# Patient Record
(demographics unavailable — no encounter records)

---

## 2024-10-09 NOTE — ASSESSMENT
[FreeTextEntry1] : 49 yo F PMH HTN and HLD presenting for evaluation of sleep apnea. She is symptomatic with loud snoring, frequent nocturnal awakenings, and occasional awakenings secondary to respiratory events. Her symptoms are suggestive of the possibility of having sleep apnea. She has been counseled on the health risks associated with KEVIN including HTN, cardiovascular disease, arrhythmia, and stroke. Potential therapeutic options have been discussed. She will schedule an HST and will follow up for the results. She was also counseled on the importance of weight loss and the relationship between weight and KEVIN.  Follow up after HST

## 2024-10-09 NOTE — HISTORY OF PRESENT ILLNESS
[Snoring] : snoring [Frequent Nocturnal Awakening] : frequent nocturnal awakening [Awakes Unrefreshed] : awakening unrefreshed [Recent  Weight Gain] : recent weight gain [DMS] : DMS [FreeTextEntry1] : 49 yo F PMH HTN and HLD presenting for evaluation of sleep apnea.   Reports history of chronic snoring that has worsened over the past few years. Symptoms have worsened in the past year with associated 25lb weight gain. Also with associated awakenings secondary to gasping. Previously worked in the [email or web address] night shift from 4am-1230pm and would frequently average 3-4 hours of sleep a night. Transitioned to day shift 2018. Does report symptoms of daytime fatigue without excessive somnolence though does nap 2-3 times a week. Does report perimenopausal symptoms.   Goes to bed: 10-11pm Falls asleep within: 30mins Gets out of bed: 530-630am Nocturnal awakenings: at least 2-3 times a night, gasping occurs once a week Naps: 2-3 times a week [Witnessed Apneas] : no witnessed sleep apnea [Awakes with Headache] : no headache upon awakening [Awakening With Dry Mouth] : no dry mouth upon awakening [Daytime Somnolence] : no daytime somnolence [DIS] : no DIS [ESS] : 4

## 2024-11-20 NOTE — ASSESSMENT
[FreeTextEntry1] : 51 yo F PMH HTN and HLD presenting for follow up of sleep apnea. She was initially seen by me for symptoms of chronic snoring and occasional nocturnal awakenings secondary to gasping that was associated with approximately 25 pound weight gain over the past few years.  She was referred for diagnostic testing with home sleep test from 11/4/2024 demonstrating overall mild sleep apnea with AHI of 9.3/h with T90 of 4.2% and seth saturation of 82%.  Risks of untreated sleep apnea and potential therapeutic options were discussed.  Given her ongoing symptoms she was referred for APAP titration for further management of KEVIN.  Importance of weight management and its relation to sleep apnea was discussed.  Follow-up after APAP titration.

## 2024-11-20 NOTE — HISTORY OF PRESENT ILLNESS
[Snoring] : snoring [FreeTextEntry1] : 51 yo F PMH HTN and HLD presenting for follow up of sleep apnea.  She was initially seen by me 10/9/2024 for symptoms of chronic snoring that worsened with approximate 25 pound weight gain over the past few years.  Also noted to have symptoms of occasional nocturnal awakenings secondary to gasping.  [place] transitioned to dayshift in 2018.  Also noted to have perimenopausal symptoms during initial evaluation.  She was referred for diagnostic home sleep apnea testing with study from 11/4/2024 demonstrating overall mild sleep apnea with AHI of 9.3/h with T90 4.2% and seth saturation of 82%.   She reports ongoing symptoms of daytime fatigue, chronic snoring, and overall poor sleep quality.  States that she has gained some weight over the past few months due to travel and life stressors. Otherwise no significant changes since prior evaluation.

## 2024-11-20 NOTE — ASSESSMENT
[FreeTextEntry1] : 49 yo F PMH HTN and HLD presenting for follow up of sleep apnea. She was initially seen by me for symptoms of chronic snoring and occasional nocturnal awakenings secondary to gasping that was associated with approximately 25 pound weight gain over the past few years.  She was referred for diagnostic testing with home sleep test from 11/4/2024 demonstrating overall mild sleep apnea with AHI of 9.3/h with T90 of 4.2% and seth saturation of 82%.  Risks of untreated sleep apnea and potential therapeutic options were discussed.  Given her ongoing symptoms she was referred for APAP titration for further management of KEVIN.  Importance of weight management and its relation to sleep apnea was discussed.  Follow-up after APAP titration.

## 2024-11-20 NOTE — HISTORY OF PRESENT ILLNESS
[Snoring] : snoring [FreeTextEntry1] : 49 yo F PMH HTN and HLD presenting for follow up of sleep apnea.  She was initially seen by me 10/9/2024 for symptoms of chronic snoring that worsened with approximate 25 pound weight gain over the past few years.  Also noted to have symptoms of occasional nocturnal awakenings secondary to gasping.  [place] transitioned to dayshift in 2018.  Also noted to have perimenopausal symptoms during initial evaluation.  She was referred for diagnostic home sleep apnea testing with study from 11/4/2024 demonstrating overall mild sleep apnea with AHI of 9.3/h with T90 4.2% and seth saturation of 82%.   She reports ongoing symptoms of daytime fatigue, chronic snoring, and overall poor sleep quality.  States that she has gained some weight over the past few months due to travel and life stressors. Otherwise no significant changes since prior evaluation.

## 2025-02-19 NOTE — HISTORY OF PRESENT ILLNESS
[FreeTextEntry1] : 49 yo F PMH HTN and HLD presenting for follow up of sleep apnea.  She was initially seen by me 10/9/2024 for symptoms of chronic snoring that worsened with approximate 25 pound weight gain over the past few years. Also noted to have symptoms of occasional nocturnal awakenings secondary to gasping. She [place] transitioned to dayshift in 2018. Also noted to have perimenopausal symptoms during initial evaluation. She was referred for diagnostic home sleep apnea testing with study from 11/4/2024 demonstrating overall mild sleep apnea with AHI of 9.3/h with T90 4.2% and seth saturation of 82%.   She was referred for follow up APAP titration though experienced difficulty with usage with average usage of 5 minutes. Tried both nasal and FFM though unable to tolerate either interface. Has not had recent episodes of gasping/choking though continues to experience daytime fatigue and nocturnal awakenings. Has chronic nasal congestion which untreated. Also reports ongoing external stressors that are contributing to daytime symptoms.  [Frequent Nocturnal Awakening] : frequent nocturnal awakening

## 2025-02-19 NOTE — ASSESSMENT
[FreeTextEntry1] : 51 yo F PMH HTN and HLD presenting for follow up of sleep apnea. Initially seen for symptoms of chronic snoring and frequent nocturnal awakenings with worsening of symptoms after associated 25 pound weight gain.  Diagnostic study from 11/4/2024 demonstrated overall mild KEVIN with AHI of 9.3/h and T90 4.2%.  She was referred for APAP titration though unable to tolerate with average usage of 5 minutes.  Alternative therapies for mild KEVIN including oral advancement therapy, excite KEVIN, and weight loss/zepbound were discussed.  She also reports comorbid chronic nasal congestion which may be worsening nocturnal sleep disordered breathing.  She will undergo trial of Flonase and assess overall response.  She was also provided a list of dentists for OAT evaluation.  She is instructed to reach out to me regarding her decisions for therapy moving forward after Flonase trial.  Follow up in 2-3 weeks

## 2025-02-19 NOTE — REASON FOR VISIT
[Follow-Up] : a follow-up visit [Home] : at home, [unfilled] , at the time of the visit. [Medical Office: ([facility])___] : at the medical office located in  [Telehealth (audio & video)] : This visit was provided via telehealth using real-time 2-way audio visual technology. [Verbal consent obtained from patient] : the patient, [unfilled] [FreeTextEntry2] : sleep apnea

## 2025-04-01 NOTE — HEALTH RISK ASSESSMENT
[Yes] : Yes [Monthly or less (1 pt)] : Monthly or less (1 point) [1 or 2 (0 pts)] : 1 or 2 (0 points) [Never (0 pts)] : Never (0 points) [No] : In the past 12 months have you used drugs other than those required for medical reasons? No [0] : 2) Feeling down, depressed, or hopeless: Not at all (0) [PHQ-2 Negative - No further assessment needed] : PHQ-2 Negative - No further assessment needed [NO] : No [With Family] : lives with family [Employed] : employed [] :  [# Of Children ___] : has [unfilled] children [Feels Safe at Home] : Feels safe at home [Fully functional (bathing, dressing, toileting, transferring, walking, feeding)] : Fully functional (bathing, dressing, toileting, transferring, walking, feeding) [Fully functional (using the telephone, shopping, preparing meals, housekeeping, doing laundry, using] : Fully functional and needs no help or supervision to perform IADLs (using the telephone, shopping, preparing meals, housekeeping, doing laundry, using transportation, managing medications and managing finances) [Smoke Detector] : smoke detector [Carbon Monoxide Detector] : carbon monoxide detector [Former] : Former [> 15 Years] : > 15 Years [HIV test declined] : HIV test declined [Hepatitis C test declined] : Hepatitis C test declined [Audit-CScore] : 1 [BBC0Amglz] : 0 [de-identified] : 1 pack a day x 15 years  [Change in mental status noted] : No change in mental status noted [Language] : denies difficulty with language [Handling Complex Tasks] : denies difficulty handling complex tasks [Reports changes in hearing] : Reports no changes in hearing [Reports changes in vision] : Reports no changes in vision [Reports changes in dental health] : Reports no changes in dental health [MammogramDate] : due [PapSmearDate] : 11/23 [PapSmearComments] : Dr Hodgson [ColonoscopyDate] : due [de-identified] : with  and 3 children [FreeTextEntry2] : [employer].   [de-identified] : 21 yo daughter, 19 yo son, 15 yo daughter

## 2025-04-01 NOTE — PHYSICAL EXAM
[No Acute Distress] : no acute distress [Well-Appearing] : well-appearing [Normal Sclera/Conjunctiva] : normal sclera/conjunctiva [PERRL] : pupils equal round and reactive to light [EOMI] : extraocular movements intact [Normal Outer Ear/Nose] : the outer ears and nose were normal in appearance [Normal Oropharynx] : the oropharynx was normal [Normal TMs] : both tympanic membranes were normal [Supple] : supple [No Respiratory Distress] : no respiratory distress  [No Accessory Muscle Use] : no accessory muscle use [Clear to Auscultation] : lungs were clear to auscultation bilaterally [Normal Rate] : normal rate  [Regular Rhythm] : with a regular rhythm [Normal S1, S2] : normal S1 and S2 [No Murmur] : no murmur heard [No Edema] : there was no peripheral edema [Soft] : abdomen soft [Non Tender] : non-tender [Non-distended] : non-distended [Normal Bowel Sounds] : normal bowel sounds [Grossly Normal Strength/Tone] : grossly normal strength/tone [No Focal Deficits] : no focal deficits [Speech Grossly Normal] : speech grossly normal [Alert and Oriented x3] : oriented to person, place, and time [de-identified] : 12 in x 5 in round, red, circular rash with central clearing. Blanching. Tender to touch.

## 2025-04-01 NOTE — REVIEW OF SYSTEMS
[Negative] : Psychiatric [Itching] : Itching [Skin Rash] : skin rash [Mole Changes] : no mole changes [Nail Changes] : no nail changes [Hair Changes] : no hair changes

## 2025-04-01 NOTE — HISTORY OF PRESENT ILLNESS
[FreeTextEntry1] : CPE [de-identified] : 51 yo F pmh HTN, KEVIN, HLD presents for CPE Reports itching throughout body for many years, however symptoms come and go.  Patient reports after eating carbohydrates such as pasta, she reports feel bloated and itching worsens.  Reports having itching and rash on left shin which has been present for the past 3 weeks. Has tenderness to touch. Does not feel swollen. No fevers, chills, muscle aches. No tick bites. Was [place] Reports increased stress due to 's diagnosis of stage 3 lung cancer. He is currently in hospital and condition has been worsening.

## 2025-04-01 NOTE — HEALTH RISK ASSESSMENT
[Yes] : Yes [Monthly or less (1 pt)] : Monthly or less (1 point) [1 or 2 (0 pts)] : 1 or 2 (0 points) [Never (0 pts)] : Never (0 points) [No] : In the past 12 months have you used drugs other than those required for medical reasons? No [0] : 2) Feeling down, depressed, or hopeless: Not at all (0) [PHQ-2 Negative - No further assessment needed] : PHQ-2 Negative - No further assessment needed [NO] : No [With Family] : lives with family [Employed] : employed [] :  [# Of Children ___] : has [unfilled] children [Feels Safe at Home] : Feels safe at home [Fully functional (bathing, dressing, toileting, transferring, walking, feeding)] : Fully functional (bathing, dressing, toileting, transferring, walking, feeding) [Fully functional (using the telephone, shopping, preparing meals, housekeeping, doing laundry, using] : Fully functional and needs no help or supervision to perform IADLs (using the telephone, shopping, preparing meals, housekeeping, doing laundry, using transportation, managing medications and managing finances) [Smoke Detector] : smoke detector [Carbon Monoxide Detector] : carbon monoxide detector [Former] : Former [> 15 Years] : > 15 Years [HIV test declined] : HIV test declined [Hepatitis C test declined] : Hepatitis C test declined [Audit-CScore] : 1 [[id number]] : 0 [de-identified] : 1 pack a day x 15 years  [Change in mental status noted] : No change in mental status noted [Language] : denies difficulty with language [Handling Complex Tasks] : denies difficulty handling complex tasks [Reports changes in hearing] : Reports no changes in hearing [Reports changes in vision] : Reports no changes in vision [Reports changes in dental health] : Reports no changes in dental health [MammogramDate] : due [PapSmearDate] : 11/23 [PapSmearComments] : Dr Hodgson [ColonoscopyDate] : due [de-identified] : with  and 3 children [FreeTextEntry2] : [employer].   [de-identified] : 21 yo daughter, 17 yo son, 17 yo daughter

## 2025-04-01 NOTE — PHYSICAL EXAM
[No Acute Distress] : no acute distress [Well-Appearing] : well-appearing [Normal Sclera/Conjunctiva] : normal sclera/conjunctiva [PERRL] : pupils equal round and reactive to light [EOMI] : extraocular movements intact [Normal Outer Ear/Nose] : the outer ears and nose were normal in appearance [Normal Oropharynx] : the oropharynx was normal [Normal TMs] : both tympanic membranes were normal [Supple] : supple [No Respiratory Distress] : no respiratory distress  [No Accessory Muscle Use] : no accessory muscle use [Clear to Auscultation] : lungs were clear to auscultation bilaterally [Normal Rate] : normal rate  [Regular Rhythm] : with a regular rhythm [Normal S1, S2] : normal S1 and S2 [No Murmur] : no murmur heard [No Edema] : there was no peripheral edema [Soft] : abdomen soft [Non Tender] : non-tender [Non-distended] : non-distended [Normal Bowel Sounds] : normal bowel sounds [Grossly Normal Strength/Tone] : grossly normal strength/tone [No Focal Deficits] : no focal deficits [Speech Grossly Normal] : speech grossly normal [Alert and Oriented x3] : oriented to person, place, and time [de-identified] : 12 in x 5 in round, red, circular rash with central clearing. Blanching. Tender to touch.

## 2025-04-01 NOTE — HISTORY OF PRESENT ILLNESS
[FreeTextEntry1] : CPE [de-identified] : 51 yo F pmh HTN, KEVIN, HLD presents for CPE Reports itching throughout body for many years, however symptoms come and go.  Patient reports after eating carbohydrates such as pasta, she reports feel bloated and itching worsens.  Reports having itching and rash on left shin which has been present for the past 3 weeks. Has tenderness to touch. Does not feel swollen. No fevers, chills, muscle aches. No tick bites. Was at resort in [place] [place] Reports increased stress due to 's diagnosis of stage 3 lung cancer. He is currently in hospital and condition has been worsening.

## 2025-04-01 NOTE — PLAN
[FreeTextEntry1] : Health Care Maintenance - routine labs, follow up results -- bloodwork performed in office - UTD influenza vaccine  - EKG May 2024 . will be following up with cardio - Colonoscopy due -- gastro referral given - Mammo due-- has script from GYN - Pap 12/24 Dr Golden - depression screen negative - recommend annual skin cancer screening with Dermatologist - recommended annual eye exam with Ophthalmologist - recommended annual dental exam- h/o HLD, HTN, KEVIN - continue lifestyle modifications - CPE in 1 year or sooner visit as needed  HTN - Blood pressure at goal  - continue amlodipine 5 mg QD  - advised to log BP at home.  - advised on dietary changes, increasing exercise, avoiding excess salt   Abdominal bloating - f/u celiac panel - gastro referral given  Rash, itching - will send in clotrimazole cream - f/u cbc to assess white count.  - low suspicion for lyme due to no recent tick exposures, however with round rash with central clearing, lyme panel ordered - derm follow up

## 2025-04-23 NOTE — HISTORY OF PRESENT ILLNESS
[FreeTextEntry1] : 50 year old woman with HTN on amlodipine, KEVIN HLD BMI = 35.67 she has had fatigue and itchiniess she recently had rash on Left ant shin  - topical antifungal given and it has since improved  She had fever with COVID July 2024 The most recent time she was in an area with potential tick exposure  was Aug 2024 - she denies any febrile or subjective illness. She is under the stress with her 's terminal lung cancer  - he is at in pt hospice at [facility] - her 3 children have come in from [place] to be together she notes her dog has been ill and she has been in Vet office.  On 3/31/25:  WBC = 9.98; H/H = 13.7/40.2; plt = 284k    Ehrllichia chafferensis (HME) IgG 1:1024 Anaplasma phagocytophilum (HGA ) IgG 1:640 Neg Babesi microti <1:16 Neg Lyme serology  Other labs were normal except increased cholesterol  She reports having had rash after PCN and Tetracycline as child - she does not recall the nature of the rash  She was prescribed Rifampin and took 2 doses when developed flat rash over her anterior chest  - rash was flat  she has no fever, malaise

## 2025-04-23 NOTE — PHYSICAL EXAM
[General Appearance - Alert] : alert [General Appearance - In No Acute Distress] : in no acute distress [Sclera] : the sclera and conjunctiva were normal [PERRL With Normal Accommodation] : pupils were equal in size, round, reactive to light [Extraocular Movements] : extraocular movements were intact [Outer Ear] : the ears and nose were normal in appearance [Oropharynx] : the oropharynx was normal with no thrush [Neck Appearance] : the appearance of the neck was normal [Neck Cervical Mass (___cm)] : no neck mass was observed [Jugular Venous Distention Increased] : there was no jugular-venous distention [Thyroid Diffuse Enlargement] : the thyroid was not enlarged [Auscultation Breath Sounds / Voice Sounds] : lungs were clear to auscultation bilaterally [Heart Rate And Rhythm] : heart rate was normal and rhythm regular [Heart Sounds Gallop] : no gallops [Heart Sounds] : normal S1 and S2 [Murmurs] : no murmurs [Heart Sounds Pericardial Friction Rub] : no pericardial rub [Edema] : there was no peripheral edema [Bowel Sounds] : normal bowel sounds [Abdomen Soft] : soft [Abdomen Tenderness] : non-tender [Abdomen Mass (___ Cm)] : no abdominal mass palpated [Costovertebral Angle Tenderness] : no CVA tenderness [No Palpable Adenopathy] : no palpable adenopathy [Musculoskeletal - Swelling] : no joint swelling [Nail Clubbing] : no clubbing  or cyanosis of the fingernails [Motor Tone] : muscle strength and tone were normal [Skin Color & Pigmentation] : normal skin color and pigmentation [] : no rash [No Focal Deficits] : no focal deficits [Oriented To Time, Place, And Person] : oriented to person, place, and time [Affect] : the affect was normal [FreeTextEntry1] : mild ressidual redness left ant tib region LLE

## 2025-04-23 NOTE — REVIEW OF SYSTEMS
[Feeling Tired] : feeling tired [As Noted in HPI] : as noted in HPI [Itching] : itching [Negative] : Heme/Lymph

## 2025-04-23 NOTE — ASSESSMENT
[Medical Care Issues] : medical care issues [FreeTextEntry1] : 50 year old woman with HTN on amlodipine, KEVIN HLD BMI = 35.67 low risk remote history of PCN, Tetracycline allergy prompt reaction to Rifampin with skin rash  The elevated IgG levels to Ehrlichia chaffeensis and Anplasma phagocytophilum from 3/31/25 suggests exposure with self limited infection last August 2024 These agents causes an acute febrile illness  usually 2 weeks after exposure. Her remote time since being in an area of potential exposure and the wrong seasonality for these illnesses (ticks are most liklely to feed and bite people during the summer) with the absence of symptoms or hematologic abnormalities rules out active infection.  If illness were more likely, I would consider doxycycline challenge CBC to be repeated to assure stability

## 2025-05-27 NOTE — PLAN
[FreeTextEntry1] : Joint pain - rheum follow up  frequent bowel movement, GERD - due for colonoscopy. Gastro referral given - advised to avoid spicy foods, caffeine, tomatoes, laying down quickly after eating - trial with PPI - elevate head of bed

## 2025-05-27 NOTE — REVIEW OF SYSTEMS
[Abdominal Pain] : no abdominal pain [Nausea] : no nausea [Constipation] : no constipation [Diarrhea] : diarrhea [Vomiting] : no vomiting [Heartburn] : heartburn [Melena] : no melena [Joint Pain] : joint pain [Joint Stiffness] : no joint stiffness [Joint Swelling] : no joint swelling [Muscle Weakness] : no muscle weakness [Muscle Pain] : no muscle pain [Back Pain] : no back pain [Negative] : Neurological [FreeTextEntry7] : frequent bowel movements

## 2025-05-27 NOTE — HISTORY OF PRESENT ILLNESS
[Home] : at home, [unfilled] , at the time of the visit. [Other Location: e.g. Home (Enter Location, City,State)___] : at [unfilled] [Telehealth (audio & video)] : This visit was provided via telehealth using real-time 2-way audio visual technology. [Verbal consent obtained from patient] : the patient, [unfilled] [FreeTextEntry1] : follow up [de-identified] : 51 yo F pmh HTN, KEVIN, HLD presents for follow up Seen at [facility] on May 19, 2025  Presented due to 1 day of diffuse body swelling and joint pain.   Of note, recently seen by RUY macias to possible ehrlichiosis, however told she told not have it.   Labs in ED showed ESR 35.   She was discharged home with prednisone and advised to follow up with rheum  Had appointment scheduled with rheum. Seen by rheum last week. Told symptoms may be due to stress. Patient reports improvement in joint swelling with steroids. Patient reports persistent GERD and frequent bowel movements. Denies diarrhea, blood in stool.  Has not had colonoscopy.  Patient with increased stress due to recent death of  from lung cancer. Was on hospice. States she is doing okay--has good days and bad days. Considering therapy. Currently working from home. Reports good support in friends and workplace.

## 2025-05-28 NOTE — ASSESSMENT
[FreeTextEntry1] : There is no evidence of inflammatory arthritis on exam at this time.  However, symptoms are likely masked by current use of steroids. Given presence of purpura on exam, concern for LCV.  check additional labs as outlined below  Suspect post-viral etiology.  OV 1 month, sooner PRN

## 2025-05-28 NOTE — HISTORY OF PRESENT ILLNESS
[FreeTextEntry1] : Pt went to ED Monday morning due to severe pain and "whole body swelled".  She has pain in the neck, hands and feet.  In February, she experienced pain and swelling in the right knee.  She used a compression band, and symptoms improved.  She was then taking care of her terminally ill  and did not have the time to go for an evaluation.  Pt 's passed away 3 weeks ago. The pain in the neck, hands and feet came suddenly and was severe.  Pt is also experiencing stomach swelling and acid reflux.  +history of viral illness which resolved after several days.  Pt had blood work done at the hospital and reviewed in HIE.  Work up negative thus far.  Pt started on prednisone in ED and discharged home. Symptoms of joint pain improved.   Of note, patient had a rash on her leg earlier this year and underwent extensive infectious work up which was inconclusive.

## 2025-05-28 NOTE — PHYSICAL EXAM
[General Appearance - Alert] : alert [General Appearance - In No Acute Distress] : in no acute distress [FreeTextEntry1] : faint petechial purpuric rash  [Oriented To Time, Place, And Person] : oriented to person, place, and time [Impaired Insight] : insight and judgment were intact [Affect] : the affect was normal

## 2025-06-12 NOTE — PHYSICAL EXAM
[Alert] : alert [Normal Voice/Communication] : normal voice/communication [Healthy Appearing] : healthy appearing [No Acute Distress] : no acute distress [Sclera] : the sclera and conjunctiva were normal [Normal Appearance] : the appearance of the neck was normal [No Neck Mass] : no neck mass was observed [Respiration, Rhythm And Depth] : normal respiratory rhythm and effort [Auscultation Breath Sounds / Voice Sounds] : lungs were clear to auscultation bilaterally [Heart Rate And Rhythm] : heart rate was normal and rhythm regular [Normal S1, S2] : normal S1 and S2 [Murmurs] : no murmurs [None] : no edema [Bowel Sounds] : normal bowel sounds [Abdomen Tenderness] : non-tender [No Masses] : no abdominal mass palpated [] : no hepatosplenomegaly [Abdomen Soft] : soft [No CVA Tenderness] : no CVA  tenderness [Normal Color / Pigmentation] : normal skin color and pigmentation [Oriented To Time, Place, And Person] : oriented to person, place, and time

## 2025-06-12 NOTE — HISTORY OF PRESENT ILLNESS
[FreeTextEntry1] :   -  Ms. Argelia Pickens is a 50-year-old woman with a history of hypertension, obstructive sleep apnea, and hyperlipidemia, referred by Dr. Kellie Krishnamurthy for evaluation of frequent bowel movements. The patient reports experiencing very frequent bowel movements, particularly after eating or drinking. She describes her stools as formed but softer than usual, with a consistency similar to thick toothpaste. Ms. Pickens reports having at least four bowel movements daily, which is a significant increase from her previous pattern of once or twice a day. She denies diarrhea, blood in the stool, or nocturnal bowel movements. The patient also complains of abdominal bloating, describing her stomach as swelling up 'like a balloon' and becoming very hard. She reports increased flatulence lately. Ms. Pickens has noticed that her symptoms worsen with the consumption of wheat and carbohydrates. She also experiences heartburn, particularly after consuming pasta or red sauce. The patient denies nausea, vomiting, or difficulty swallowing. Ms. Pickens reports that her symptoms have worsened over the past couple of months, coinciding with a period of significant stress following her 's passing less than two months ago. She denies recent weight loss but mentions weight gain due to inability to exercise because of pain. The patient has not started any new medications recently but reports increased use of over-the-counter pain relievers like Motrin or naproxen. Ms. Pickens has no history of inflammatory bowel disease, celiac disease, or colon cancer in her family. She quit smoking in 2002 and drinks alcohol socially. The patient has not traveled extensively recently, except for trips to [place], [place], and [place]. She reports having had COVID-19 last summer for the second time and a viral upper respiratory infection shortly after her 's passing. Ms. Pickens has not had any recent antibiotic use except for a brief course for a tick-borne illness, which was discontinued due to an allergic reaction. The patient has not had a colonoscopy before.  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 - Neurological : No headaches, dizziness, or numbness reported. - Musculoskeletal : Reports aches and pains, currently being evaluated by a rheumatologist. - Cardiovascular : No chest pain, palpitations, or edema reported. - Respiratory : Recent upper respiratory infection after 's passing, now resolved. - Gastrointestinal : Frequent bowel movements, abdominal bloating, flatulence, and heartburn. No blood in stool, no nausea or vomiting. - Genitourinary : No issues reported. - Integumentary : No rashes or skin changes reported. - Psychiatric : Experiencing significant stress due to recent loss of .   - Allergies : - Penicillin - Tetracycline - Unspecified antibiotic given for tick-borne illness (started with 'R')  Objective: - Diagnostic Results reviewed during this visit : - C-reactive protein: 9 on June 5, 2025 - Viral hepatitis serologies: Negative - CBC (April 24, 2025): Hemoglobin 13.6, WBC 8.58, Platelets 311,000 - CMP (April 1, 2025): Total bilirubin 0.2, AST 17, ALT 21, Alkaline phosphatase 78 - Tissue transglutaminase IgA antibody: 0.8 on April 1, 2025

## 2025-06-12 NOTE — REVIEW OF SYSTEMS
[Recent Weight Gain (___ Lbs)] : recent [unfilled] ~Ulb weight gain [Leg Claudication (leg pain with exertion)] : intermittent leg claudication [Lower Ext Edema (lower leg swelling)] : lower extremity edema [Heartburn] : heartburn [Joint Swelling] : joint swelling [Joint Stiffness] : joint stiffness [Limb Swelling] : limb swelling [Negative] : Heme/Lymph [FreeTextEntry7] : very frequent bowel movement

## 2025-06-12 NOTE — ASSESSMENT
[FreeTextEntry1] : Change in bowel habits : Patient presents with symptoms with increased stool frequency but stools are formed and softer than before. Bowel movements seem to be triggered by by eating or drinking and is accompanied by abdominal bloating, and increased flatulence. The differential diagnosis includes: IBS, malabsorption (celiac or pancreatic insufficiency), inflammation (microscopic colitis), or infection.  - checked total IgA level to rule out selective IgA deficiency - Ordered fecal pancreatic elastase test - Checked serum lipase levels -During colonoscopy, take biopsies to rule out microscopic colitis - Prescribed hyoscyamine as an antispasmodic to reduce urgency and frequency of bowel movements or  trial of encapsulated peppermint oil (Ibgard) before meals as a natural antispasmodic  Gastroesophageal Reflux Disease (GERD) : Patient reports frequent heartburn, especially with consumption of certain foods like pasta and red sauce. - Recommend dietary modifications, including avoiding trigger foods - Consider prescription of proton pump inhibitor if symptoms persist - Educate on lifestyle modifications such as avoiding late meals and elevating the head of the bed  Colon Cancer Screening : Patient is overdue for colon cancer screening at age 50. - Schedule colonoscopy for January (as per patient's preference due to work constraints) I reviewed the risks and benefits of the colonoscopy and answered the patient's questions. Diet and bowel prep instructions were provided. Medication guidance also provided for the days before and day of procedure.  Patient is aware that should they receive sedation for the procedure, they will need to arrange an escort to take them home.  Procedure type: colonoscopy Indication: screening Prep type: Surpep PST needed:   Total time spent caring for the patient today was 45 minutes. This includes the time spent before the visit reviewing the chart, time spent during the visit and time spent after the visit on documentation and coordination of care.

## 2025-07-28 NOTE — PHYSICAL EXAM
[General Appearance - Alert] : alert [General Appearance - In No Acute Distress] : in no acute distress [Full Pulse] : the pedal pulses are present [Edema] : there was no peripheral edema [Abnormal Walk] : normal gait [Nail Clubbing] : no clubbing  or cyanosis of the fingernails [Musculoskeletal - Swelling] : no joint swelling seen [Motor Tone] : muscle strength and tone were normal [FreeTextEntry1] : soft tissue swelling b/l ankles

## 2025-07-28 NOTE — HISTORY OF PRESENT ILLNESS
[FreeTextEntry1] : Prior history  Pt went to ED Monday morning due to severe pain and "whole body swelled".  She has pain in the neck, hands and feet.  In February, she experienced pain and swelling in the right knee.  She used a compression band, and symptoms improved.  She was then taking care of her terminally ill  and did not have the time to go for an evaluation.  Pt 's passed away 3 weeks ago. The pain in the neck, hands and feet came suddenly and was severe.  Pt is also experiencing stomach swelling and acid reflux.  +history of viral illness which resolved after several days.  Pt had blood work done at the hospital and reviewed in [facility].  Work up negative thus far.  Pt started on prednisone in ED and discharged home. Symptoms of joint pain improved.   Of note, patient had a rash on her leg earlier this year and underwent extensive infectious work up which was inconclusive.   Interval history ____________ Patient present for a follow up visit today Left 1st toes went numb, and it is still numb for the past 3 weeks  numbness and tingling in the feet and bottom of the feet.  She is unable to fit in most of her shoes due to swelling in the feet.  she no longer has wrist and hand pain, however s/p US Of the joints which reveal mild tendinosis of the distal Achilles tendon b/l and nonspecific small effusions within bilateral wrists. No hyperemia; no synovitis of the hands

## 2025-07-28 NOTE — ASSESSMENT
[FreeTextEntry1] : My collective time spent on today's visit was greater than 30 minutes and included: Preparation for the visit, review of the medical records, review of pertinent diagnostic studies, examination and counseling of the patient on the above diagnosis, treatment plan and prognosis, orders of diagnostic test, medications and or appropriate procedures and documentation in the medical record of today's visit.